# Patient Record
Sex: MALE | Race: BLACK OR AFRICAN AMERICAN | NOT HISPANIC OR LATINO | Employment: PART TIME | URBAN - METROPOLITAN AREA
[De-identification: names, ages, dates, MRNs, and addresses within clinical notes are randomized per-mention and may not be internally consistent; named-entity substitution may affect disease eponyms.]

---
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
## 2019-07-04 ENCOUNTER — APPOINTMENT (EMERGENCY)
Dept: RADIOLOGY | Facility: HOSPITAL | Age: 19
End: 2019-07-04
Payer: COMMERCIAL

## 2019-07-04 ENCOUNTER — HOSPITAL ENCOUNTER (EMERGENCY)
Facility: HOSPITAL | Age: 19
Discharge: HOME | End: 2019-07-04
Attending: EMERGENCY MEDICINE | Admitting: EMERGENCY MEDICINE
Payer: COMMERCIAL

## 2019-07-04 VITALS
SYSTOLIC BLOOD PRESSURE: 109 MMHG | TEMPERATURE: 98.3 F | HEART RATE: 93 BPM | HEIGHT: 73 IN | DIASTOLIC BLOOD PRESSURE: 60 MMHG | RESPIRATION RATE: 14 BRPM | OXYGEN SATURATION: 98 % | WEIGHT: 176.59 LBS | BODY MASS INDEX: 23.4 KG/M2

## 2019-07-04 DIAGNOSIS — S41.119A ARM LACERATION: Primary | ICD-10-CM

## 2019-07-04 PROCEDURE — 90715 TDAP VACCINE 7 YRS/> IM: CPT | Performed by: PHYSICIAN ASSISTANT

## 2019-07-04 PROCEDURE — 99284 EMERGENCY DEPT VISIT MOD MDM: CPT | Performed by: PHYSICIAN ASSISTANT

## 2019-07-04 PROCEDURE — 12001 RPR S/N/AX/GEN/TRNK 2.5CM/<: CPT | Performed by: PHYSICIAN ASSISTANT

## 2019-07-04 PROCEDURE — 73090 X-RAY EXAM OF FOREARM: CPT

## 2019-07-04 PROCEDURE — 90471 IMMUNIZATION ADMIN: CPT

## 2019-07-04 PROCEDURE — 99283 EMERGENCY DEPT VISIT LOW MDM: CPT

## 2019-07-04 RX ORDER — CLINDAMYCIN HYDROCHLORIDE 300 MG/1
300 CAPSULE ORAL EVERY 6 HOURS SCHEDULED
Qty: 40 CAPSULE | Refills: 0 | Status: SHIPPED | OUTPATIENT
Start: 2019-07-04 | End: 2019-07-15

## 2019-07-04 RX ORDER — LIDOCAINE HYDROCHLORIDE 10 MG/ML
5 INJECTION, SOLUTION EPIDURAL; INFILTRATION; INTRACAUDAL; PERINEURAL ONCE
Status: COMPLETED | OUTPATIENT
Start: 2019-07-04 | End: 2019-07-04

## 2019-07-04 RX ORDER — ACETAMINOPHEN 325 MG/1
650 TABLET ORAL ONCE
Status: COMPLETED | OUTPATIENT
Start: 2019-07-04 | End: 2019-07-04

## 2019-07-04 RX ORDER — IBUPROFEN 600 MG/1
600 TABLET ORAL EVERY 6 HOURS PRN
Qty: 30 TABLET | Refills: 0 | Status: SHIPPED | OUTPATIENT
Start: 2019-07-04 | End: 2019-07-15

## 2019-07-04 RX ORDER — CLINDAMYCIN HYDROCHLORIDE 150 MG/1
300 CAPSULE ORAL ONCE
Status: COMPLETED | OUTPATIENT
Start: 2019-07-04 | End: 2019-07-04

## 2019-07-04 RX ADMIN — LIDOCAINE HYDROCHLORIDE 5 ML: 10 INJECTION, SOLUTION EPIDURAL; INFILTRATION; INTRACAUDAL; PERINEURAL at 08:15

## 2019-07-04 RX ADMIN — CLINDAMYCIN HYDROCHLORIDE 300 MG: 150 CAPSULE ORAL at 09:38

## 2019-07-04 RX ADMIN — ACETAMINOPHEN 650 MG: 325 TABLET ORAL at 09:37

## 2019-07-04 RX ADMIN — TETANUS TOXOID, REDUCED DIPHTHERIA TOXOID AND ACELLULAR PERTUSSIS VACCINE, ADSORBED 0.5 ML: 5; 2.5; 8; 8; 2.5 SUSPENSION INTRAMUSCULAR at 09:38

## 2019-07-04 NOTE — ED PROVIDER NOTES
History  Chief Complaint   Patient presents with    Extremity Laceration     arm went through window-arguing with another person and punched window       History provided by:  Patient   used: No    Medical Problem   Location:  Pt with left forearm laceration from broken glass at 1am this morning  Severity:  Mild  Onset quality:  Sudden  Duration:  8 hours  Timing:  Constant  Progression:  Unchanged  Chronicity:  New  Associated symptoms: no abdominal pain, no chest pain, no congestion, no cough, no diarrhea, no ear pain, no fatigue, no fever, no headaches, no loss of consciousness, no myalgias, no nausea, no rash, no rhinorrhea, no shortness of breath, no sore throat, no vomiting and no wheezing        None       No past medical history on file  No past surgical history on file  No family history on file  I have reviewed and agree with the history as documented  Social History     Tobacco Use    Smoking status: Current Some Day Smoker     Types: Cigarettes    Smokeless tobacco: Never Used    Tobacco comment: occasional   Substance Use Topics    Alcohol use: Yes     Comment: socially    Drug use: Not Currently        Review of Systems   Constitutional: Negative  Negative for fatigue and fever  HENT: Negative  Negative for congestion, ear pain, rhinorrhea and sore throat  Eyes: Negative  Respiratory: Negative  Negative for cough, shortness of breath and wheezing  Cardiovascular: Negative  Negative for chest pain  Gastrointestinal: Negative  Negative for abdominal pain, diarrhea, nausea and vomiting  Endocrine: Negative  Genitourinary: Negative  Musculoskeletal: Negative  Negative for myalgias  Skin: Negative  Negative for rash  Allergic/Immunologic: Negative  Neurological: Negative  Negative for loss of consciousness and headaches  Hematological: Negative  Psychiatric/Behavioral: Negative      All other systems reviewed and are negative  Physical Exam  Physical Exam   Constitutional: He is oriented to person, place, and time  He appears well-developed and well-nourished  HENT:   Head: Normocephalic and atraumatic  Right Ear: External ear normal    Left Ear: External ear normal    Nose: Nose normal    Mouth/Throat: Oropharynx is clear and moist    Eyes: Pupils are equal, round, and reactive to light  Conjunctivae and EOM are normal    Neck: Normal range of motion  Neck supple  Cardiovascular: Normal rate, regular rhythm, normal heart sounds and intact distal pulses  Pulmonary/Chest: Effort normal and breath sounds normal    Abdominal: Soft  Bowel sounds are normal    Musculoskeletal: Normal range of motion  Left forearm laceration x 2  Several abrasion  With steristrips  Distal neuro and vascular wnl   Radial pulse strong    Neurological: He is alert and oriented to person, place, and time  Skin: Skin is warm  Nursing note and vitals reviewed  Vital Signs  ED Triage Vitals [07/04/19 0807]   Temperature Pulse Respirations Blood Pressure SpO2   98 3 °F (36 8 °C) 93 14 109/60 98 %      Temp src Heart Rate Source Patient Position - Orthostatic VS BP Location FiO2 (%)   -- -- -- -- --      Pain Score       No Pain           Vitals:    07/04/19 0807   BP: 109/60   Pulse: 93         Visual Acuity      ED Medications  Medications   lidocaine (PF) (XYLOCAINE-MPF) 1 % injection 5 mL (5 mL Infiltration Given 7/4/19 0815)   clindamycin (CLEOCIN) capsule 300 mg (300 mg Oral Given 7/4/19 0938)   acetaminophen (TYLENOL) tablet 650 mg (650 mg Oral Given 7/4/19 0937)   tetanus-diphtheria-acellular pertussis (BOOSTRIX) IM injection 0 5 mL (0 5 mL Intramuscular Given 7/4/19 0624)       Diagnostic Studies  Results Reviewed     None                 XR forearm 2 views LEFT   Final Result by Narcisa Young MD (07/04 1797)      No acute osseous abnormality              Workstation performed: [id number]                    Procedures  Procedures ED Course                               MDM    Disposition  Final diagnoses:   Arm laceration     Time reflects when diagnosis was documented in both MDM as applicable and the Disposition within this note     Time User Action Codes Description Comment    7/4/2019  9:13 AM Antonio Allen  Add [S41 119A] Arm laceration       ED Disposition     ED Disposition Condition Date/Time Comment    Discharge Stable u Jul 4, 2019  9:14 AM Christina Lack discharge to home/self care  Follow-up Information     Follow up With Specialties Details Why Contact Info    Love Bailon MD Family Medicine  sutures out in 10-12 days [address] [place] [address]  [phone]            Discharge Medication List as of 7/4/2019  9:15 AM      START taking these medications    Details   clindamycin (CLEOCIN) 300 MG capsule Take 1 capsule (300 mg total) by mouth every 6 (six) hours for 10 days, Starting u 7/4/2019, Until Sun 7/14/2019, Print      ibuprofen (MOTRIN) 600 mg tablet Take 1 tablet (600 mg total) by mouth every 6 (six) hours as needed for mild pain, Starting u 7/4/2019, Print           No discharge procedures on file      ED Provider  Electronically Signed by           Gamal Golden PA-C  07/04/19 [address] Vadim Carballo PA-C  07/04/19 8369

## 2019-07-04 NOTE — ED PROCEDURE NOTE
Procedure  Laceration repair  Date/Time: 7/4/2019 8:46 AM  Performed by: Doris Frazier PA-C  Authorized by: Doris Frazier PA-C   Consent: Verbal consent obtained  Written consent not obtained  Risks and benefits: risks, benefits and alternatives were discussed  Consent given by: patient  Patient understanding: patient states understanding of the procedure being performed  Patient consent: the patient's understanding of the procedure matches consent given  Procedure consent: procedure consent matches procedure scheduled  Relevant documents: relevant documents present and verified  Test results: test results available and properly labeled  Site marked: the operative site was marked  Radiology Images displayed and confirmed  If images not available, report reviewed: imaging studies available  Required items: required blood products, implants, devices, and special equipment available  Patient identity confirmed: verbally with patient  Time out: Immediately prior to procedure a "time out" was called to verify the correct patient, procedure, equipment, support staff and site/side marked as required  Body area: upper extremity  Location details: left lower arm  Laceration length: 0 5 cm  Foreign bodies: no foreign bodies  Tendon involvement: none  Nerve involvement: none  Vascular damage: no  Anesthesia: local infiltration    Anesthesia:  Local Anesthetic: lidocaine 1% without epinephrine  Anesthetic total: 4 mL    Sedation:  Patient sedated: no      Wound Dehiscence:  Superficial Wound Dehiscence: simple closure      Procedure Details:  Preparation: Patient was prepped and draped in the usual sterile fashion    Irrigation solution: tap water  Irrigation method: syringe  Amount of cleaning: extensive  Debridement: none  Degree of undermining: none  Skin closure: 4-0 nylon  Number of sutures: 6  Technique: simple  Approximation: loose  Approximation difficulty: simple  Dressing: 4x4 sterile gauze  Patient tolerance: Patient tolerated the procedure well with no immediate complications                       Reginald Bueno PA-C  07/04/19 3476

## 2020-07-10 ENCOUNTER — HOSPITAL ENCOUNTER (EMERGENCY)
Facility: HOSPITAL | Age: 20
Discharge: HOME | End: 2020-07-10
Attending: EMERGENCY MEDICINE | Admitting: EMERGENCY MEDICINE
Payer: COMMERCIAL

## 2020-07-10 VITALS
OXYGEN SATURATION: 98 % | RESPIRATION RATE: 14 BRPM | WEIGHT: 195.33 LBS | DIASTOLIC BLOOD PRESSURE: 79 MMHG | TEMPERATURE: 98.1 F | HEART RATE: 64 BPM | BODY MASS INDEX: 25.08 KG/M2 | SYSTOLIC BLOOD PRESSURE: 119 MMHG

## 2020-07-10 DIAGNOSIS — Z02.89 ENCOUNTER TO OBTAIN EXCUSE FROM WORK: Primary | ICD-10-CM

## 2020-07-10 PROCEDURE — 99282 EMERGENCY DEPT VISIT SF MDM: CPT

## 2020-07-10 PROCEDURE — 99281 EMR DPT VST MAYX REQ PHY/QHP: CPT | Performed by: PHYSICIAN ASSISTANT

## 2020-07-10 NOTE — ED PROVIDER NOTES
History  Chief Complaint   Patient presents with    Letter for School/Work     pt needs note to return to work, pt states he hurt right shoulder last week employer asking note to return to work no c/o at this time      59-year-old male with no past medical history presents emergency department for evaluation for work note  Patient reports he hurt his right shoulder while moving some heavy boxes 1 week ago  Patient was seen by his PCP, and his job is requiring a return to work note  Patient reports that the right shoulder pain has completely resolved, denies any numbness, tingling in his right hand  Patient reports he is right-hand dominant  Patient offers no other complaints at this time  History provided by:  Patient and medical records   used: No        None       History reviewed  No pertinent past medical history  History reviewed  No pertinent surgical history  History reviewed  No pertinent family history  I have reviewed and agree with the history as documented  E-Cigarette/Vaping    E-Cigarette Use Never User      E-Cigarette/Vaping Substances     Social History     Tobacco Use    Smoking status: Current Some Day Smoker     Types: Cigarettes    Smokeless tobacco: Never Used    Tobacco comment: occasional- black&milds   Substance Use Topics    Alcohol use: Yes     Comment: socially    Drug use: Not Currently       Review of Systems   Constitutional: Negative for chills and fever  HENT: Negative for congestion and sore throat  Respiratory: Negative for cough and shortness of breath  Cardiovascular: Negative for chest pain  Gastrointestinal: Negative for abdominal pain, diarrhea, nausea and vomiting  Skin: Negative for rash  Neurological: Negative for headaches  All other systems reviewed and are negative  Physical Exam  Physical Exam   Constitutional: He is oriented to person, place, and time  He appears well-developed and well-nourished  Non-toxic appearance  He does not appear ill  No distress  HENT:   Head: Normocephalic and atraumatic  Right Ear: Hearing and external ear normal    Left Ear: Hearing and external ear normal    Nose: Nose normal    Mouth/Throat: Mucous membranes are normal    Eyes: Conjunctivae and EOM are normal    Neck: No JVD present  No tracheal deviation present  Cardiovascular: Normal rate, regular rhythm, S1 normal, S2 normal and normal heart sounds  Pulmonary/Chest: Effort normal and breath sounds normal  No accessory muscle usage or stridor  No respiratory distress  He has no decreased breath sounds  He has no wheezes  He has no rales  Musculoskeletal:   Moves all four limbs without difficulty, crepitus, swelling, or deformity  Specifically, FROM to the right shoulder without pain  Neurological: He is alert and oriented to person, place, and time  GCS eye subscore is 4  GCS verbal subscore is 5  GCS motor subscore is 6  Skin: Skin is warm and dry  Capillary refill takes less than 2 seconds  No rash noted  Psychiatric: He has a normal mood and affect  His speech is normal    Nursing note and vitals reviewed  Vital Signs  ED Triage Vitals [07/10/20 1429]   Temperature Pulse Respirations Blood Pressure SpO2   98 1 °F (36 7 °C) 64 14 119/79 98 %      Temp Source Heart Rate Source Patient Position - Orthostatic VS BP Location FiO2 (%)   Temporal Monitor Sitting Right arm --      Pain Score       No Pain           Vitals:    07/10/20 1429   BP: 119/79   Pulse: 64   Patient Position - Orthostatic VS: Sitting         Visual Acuity      ED Medications  Medications - No data to display    Diagnostic Studies  Results Reviewed     None                 No orders to display              Procedures  Procedures         ED Course       US AUDIT      Most Recent Value   Initial Alcohol Screen: US AUDIT-C    1  How often do you have a drink containing alcohol?  0 Filed at: 07/10/2020 1430   2   How many drinks containing alcohol do you have on a typical day you are drinking? 0 Filed at: 07/10/2020 1430   3a  Male UNDER 65: How often do you have five or more drinks on one occasion? 0 Filed at: 07/10/2020 1430   3b  FEMALE Any Age, or MALE 65+: How often do you have 4 or more drinks on one occassion? 0 Filed at: 07/10/2020 1430   Audit-C Score  0 Filed at: 07/10/2020 1430                  CECILIA/DAST-10      Most Recent Value   How many times in the past year have you    Used an illegal drug or used a prescription medication for non-medical reasons? Never Filed at: 07/10/2020 1430                                MDM  Number of Diagnoses or Management Options  Encounter to obtain excuse from work:         Disposition  Final diagnoses:   Encounter to obtain excuse from work     Time reflects when diagnosis was documented in both MDM as applicable and the Disposition within this note     Time User Action Codes Description Comment    7/10/2020  2:56 PM Dennise Gotti [Z02 [id number]] Encounter to obtain excuse from work       ED Disposition     ED Disposition Condition Date/Time Comment    Discharge Stable Fri Jul 10, 2020  2:56 PM Bakari Burton discharge to home/self care  Follow-up Information     Follow up With Specialties Details Why Contact Info    Cassi Parker MD Family Medicine Schedule an appointment as soon as possible for a visit  If symptoms worsen, As needed [address] [place] [address]  [phone]            There are no discharge medications for this patient  No discharge procedures on file      PDMP Review     None          ED Provider  Electronically Signed by           Ruthy Spangler PA-C  07/10/20 4047

## 2020-09-28 ENCOUNTER — HOSPITAL ENCOUNTER (EMERGENCY)
Facility: HOSPITAL | Age: 20
Discharge: HOME | End: 2020-09-28
Attending: EMERGENCY MEDICINE | Admitting: EMERGENCY MEDICINE
Payer: COMMERCIAL

## 2020-09-28 VITALS
HEART RATE: 67 BPM | TEMPERATURE: 98.4 F | RESPIRATION RATE: 18 BRPM | OXYGEN SATURATION: 97 % | DIASTOLIC BLOOD PRESSURE: 64 MMHG | SYSTOLIC BLOOD PRESSURE: 119 MMHG

## 2020-09-28 DIAGNOSIS — M54.50 ACUTE BILATERAL LOW BACK PAIN WITHOUT SCIATICA: Primary | ICD-10-CM

## 2020-09-28 PROCEDURE — 99284 EMERGENCY DEPT VISIT MOD MDM: CPT | Performed by: PHYSICIAN ASSISTANT

## 2020-09-28 PROCEDURE — 99283 EMERGENCY DEPT VISIT LOW MDM: CPT

## 2020-09-28 RX ORDER — METHOCARBAMOL 500 MG/1
500 TABLET, FILM COATED ORAL ONCE
Status: COMPLETED | OUTPATIENT
Start: 2020-09-28 | End: 2020-09-28

## 2020-09-28 RX ORDER — LIDOCAINE 50 MG/G
1 PATCH TOPICAL ONCE
Status: DISCONTINUED | OUTPATIENT
Start: 2020-09-28 | End: 2020-09-28 | Stop reason: HOSPADM

## 2020-09-28 RX ORDER — IBUPROFEN 600 MG/1
600 TABLET ORAL ONCE
Status: COMPLETED | OUTPATIENT
Start: 2020-09-28 | End: 2020-09-28

## 2020-09-28 RX ORDER — METHOCARBAMOL 500 MG/1
500 TABLET, FILM COATED ORAL EVERY 12 HOURS PRN
Qty: 14 TABLET | Refills: 0 | Status: SHIPPED | OUTPATIENT
Start: 2020-09-28 | End: 2020-10-24

## 2020-09-28 RX ORDER — IBUPROFEN 600 MG/1
600 TABLET ORAL EVERY 6 HOURS PRN
Qty: 20 TABLET | Refills: 0 | Status: SHIPPED | OUTPATIENT
Start: 2020-09-28 | End: 2020-10-24

## 2020-09-28 RX ADMIN — LIDOCAINE 1 PATCH: 50 PATCH CUTANEOUS at 13:56

## 2020-09-28 RX ADMIN — METHOCARBAMOL 500 MG: 500 TABLET ORAL at 13:56

## 2020-09-28 RX ADMIN — IBUPROFEN 600 MG: 600 TABLET ORAL at 13:56

## 2020-10-24 ENCOUNTER — HOSPITAL ENCOUNTER (EMERGENCY)
Facility: HOSPITAL | Age: 20
Discharge: HOME | End: 2020-10-24
Attending: EMERGENCY MEDICINE | Admitting: EMERGENCY MEDICINE
Payer: COMMERCIAL

## 2020-10-24 VITALS
TEMPERATURE: 98.2 F | DIASTOLIC BLOOD PRESSURE: 92 MMHG | SYSTOLIC BLOOD PRESSURE: 144 MMHG | HEART RATE: 63 BPM | OXYGEN SATURATION: 98 % | RESPIRATION RATE: 18 BRPM

## 2020-10-24 DIAGNOSIS — R30.0 DYSURIA: Primary | ICD-10-CM

## 2020-10-24 LAB
BACTERIA UR QL AUTO: ABNORMAL /HPF
BILIRUB UR QL STRIP: NEGATIVE
CLARITY UR: CLEAR
CLARITY, POC: CLEAR
COLOR UR: YELLOW
COLOR, POC: YELLOW
GLUCOSE UR STRIP-MCNC: NEGATIVE MG/DL
HGB UR QL STRIP.AUTO: ABNORMAL
KETONES UR STRIP-MCNC: NEGATIVE MG/DL
LEUKOCYTE ESTERASE UR QL STRIP: ABNORMAL
NITRITE UR QL STRIP: NEGATIVE
NON-SQ EPI CELLS URNS QL MICRO: ABNORMAL /HPF
PH UR STRIP.AUTO: 6 [PH] (ref 4.5–8)
PROT UR STRIP-MCNC: NEGATIVE MG/DL
RBC #/AREA URNS AUTO: ABNORMAL /HPF
SP GR UR STRIP.AUTO: 1.02 (ref 1–1.03)
UROBILINOGEN UR QL STRIP.AUTO: 0.2 E.U./DL
WBC #/AREA URNS AUTO: ABNORMAL /HPF

## 2020-10-24 PROCEDURE — 87086 URINE CULTURE/COLONY COUNT: CPT

## 2020-10-24 PROCEDURE — 87591 N.GONORRHOEAE DNA AMP PROB: CPT | Performed by: EMERGENCY MEDICINE

## 2020-10-24 PROCEDURE — 99285 EMERGENCY DEPT VISIT HI MDM: CPT | Performed by: EMERGENCY MEDICINE

## 2020-10-24 PROCEDURE — 81001 URINALYSIS AUTO W/SCOPE: CPT

## 2020-10-24 PROCEDURE — 96372 THER/PROPH/DIAG INJ SC/IM: CPT

## 2020-10-24 PROCEDURE — 99283 EMERGENCY DEPT VISIT LOW MDM: CPT

## 2020-10-24 PROCEDURE — 87491 CHLMYD TRACH DNA AMP PROBE: CPT | Performed by: EMERGENCY MEDICINE

## 2020-10-24 RX ORDER — AZITHROMYCIN 250 MG/1
500 TABLET, FILM COATED ORAL ONCE
Status: COMPLETED | OUTPATIENT
Start: 2020-10-24 | End: 2020-10-24

## 2020-10-24 RX ADMIN — LIDOCAINE HYDROCHLORIDE 250 MG: 10 INJECTION, SOLUTION EPIDURAL; INFILTRATION; INTRACAUDAL; PERINEURAL at 13:21

## 2020-10-24 RX ADMIN — AZITHROMYCIN MONOHYDRATE 500 MG: 250 TABLET ORAL at 13:21

## 2020-10-25 LAB
BACTERIA UR CULT: NORMAL
C TRACH DNA SPEC QL NAA+PROBE: POSITIVE
N GONORRHOEA DNA SPEC QL NAA+PROBE: NEGATIVE

## 2021-03-31 ENCOUNTER — APPOINTMENT (EMERGENCY)
Dept: RADIOLOGY | Facility: HOSPITAL | Age: 21
End: 2021-03-31
Payer: COMMERCIAL

## 2021-03-31 ENCOUNTER — HOSPITAL ENCOUNTER (EMERGENCY)
Facility: HOSPITAL | Age: 21
Discharge: HOME | End: 2021-03-31
Attending: EMERGENCY MEDICINE | Admitting: EMERGENCY MEDICINE
Payer: COMMERCIAL

## 2021-03-31 VITALS
RESPIRATION RATE: 16 BRPM | HEART RATE: 69 BPM | SYSTOLIC BLOOD PRESSURE: 137 MMHG | BODY MASS INDEX: 28.25 KG/M2 | OXYGEN SATURATION: 100 % | WEIGHT: 220 LBS | DIASTOLIC BLOOD PRESSURE: 83 MMHG | TEMPERATURE: 97.9 F

## 2021-03-31 DIAGNOSIS — S82.899A ANKLE FRACTURE: Primary | ICD-10-CM

## 2021-03-31 PROCEDURE — 73610 X-RAY EXAM OF ANKLE: CPT

## 2021-03-31 PROCEDURE — 29515 APPLICATION SHORT LEG SPLINT: CPT | Performed by: PHYSICIAN ASSISTANT

## 2021-03-31 PROCEDURE — 99283 EMERGENCY DEPT VISIT LOW MDM: CPT

## 2021-03-31 PROCEDURE — 99284 EMERGENCY DEPT VISIT MOD MDM: CPT | Performed by: PHYSICIAN ASSISTANT

## 2021-03-31 RX ORDER — IBUPROFEN 400 MG/1
400 TABLET ORAL ONCE
Status: COMPLETED | OUTPATIENT
Start: 2021-03-31 | End: 2021-03-31

## 2021-03-31 RX ADMIN — IBUPROFEN 400 MG: 400 TABLET ORAL at 18:26

## 2021-03-31 NOTE — ED PROVIDER NOTES
History  Chief Complaint   Patient presents with    Ankle Pain     Pt reports rolling R ankle yesterday while playing basketball, reports swelling and pain  Patient is a 22 y/o male with no significant PMH who presents for evaluation of right ankle pain  He states he was playing basketball yesterday when he came down on it wrong and rolled it  He was able to stand and weight bear after the fall/injury however pain started later in the evening and has been constant since  He complains of pain, swelling, bruising to the medial and lateral aspects of the ankle  He states the pain does radiate into the proximal foot  He denies other injury during the fall  He states he has twisted/sprained that ankle in the past but denies fracture or surgery  He states yesterday he took ibuprofen which provided some relief however he did not take any medications today  He denies fever, chills, nausea, vomiting, diarrhea, chest pain, shrotness of breath, abdominal, numbness or weakness  None       Past Medical History:   Diagnosis Date    No known problems        History reviewed  No pertinent surgical history  History reviewed  No pertinent family history  I have reviewed and agree with the history as documented  E-Cigarette/Vaping    E-Cigarette Use Never User      E-Cigarette/Vaping Substances    Nicotine No     THC No     CBD No     Flavoring No     Other No     Unknown No      Social History     Tobacco Use    Smoking status: Never Smoker    Smokeless tobacco: Never Used    Tobacco comment: occasional- black&milds   Substance Use Topics    Alcohol use: Not Currently     Comment: socially    Drug use: Not Currently       Review of Systems   Constitutional: Negative for chills and fever  Respiratory: Negative for shortness of breath  Cardiovascular: Negative for chest pain  Gastrointestinal: Negative for abdominal pain, diarrhea, nausea and vomiting     Musculoskeletal: Positive for arthralgias, gait problem and joint swelling  Skin: Negative for rash  Neurological: Negative for weakness and numbness  All other systems reviewed and are negative  Physical Exam  Physical Exam  Vitals signs and nursing note reviewed  Constitutional:       General: He is not in acute distress  Appearance: Normal appearance  He is normal weight  He is not ill-appearing, toxic-appearing or diaphoretic  HENT:      Head: Normocephalic and atraumatic  Right Ear: External ear normal       Left Ear: External ear normal    Eyes:      Conjunctiva/sclera: Conjunctivae normal    Neck:      Musculoskeletal: Normal range of motion  Cardiovascular:      Rate and Rhythm: Normal rate and regular rhythm  Heart sounds: Normal heart sounds  No murmur  Pulmonary:      Effort: Pulmonary effort is normal  No respiratory distress  Breath sounds: Normal breath sounds  No stridor  No wheezing or rhonchi  Abdominal:      General: Abdomen is flat  There is no distension  Musculoskeletal:      Right ankle: He exhibits decreased range of motion, swelling and ecchymosis  He exhibits no deformity, no laceration and normal pulse  Tenderness  Lateral malleolus and medial malleolus tenderness found  No head of 5th metatarsal and no proximal fibula tenderness found  Achilles tendon normal    Skin:     General: Skin is warm and dry  Neurological:      General: No focal deficit present  Mental Status: He is alert     Psychiatric:         Mood and Affect: Mood normal          Vital Signs  ED Triage Vitals   Temperature Pulse Respirations Blood Pressure SpO2   03/31/21 1648 03/31/21 1646 03/31/21 1646 03/31/21 1646 03/31/21 1646   97 9 °F (36 6 °C) 69 16 137/83 100 %      Temp Source Heart Rate Source Patient Position - Orthostatic VS BP Location FiO2 (%)   03/31/21 1648 03/31/21 1646 03/31/21 1646 03/31/21 1646 --   Oral Monitor Sitting Left arm       Pain Score       03/31/21 1646       6 Vitals:    03/31/21 1646   BP: 137/83   Pulse: 69   Patient Position - Orthostatic VS: Sitting         Visual Acuity      ED Medications  Medications   ibuprofen (MOTRIN) tablet 400 mg (400 mg Oral Given 3/31/21 1826)       Diagnostic Studies  Results Reviewed     None                 XR ankle 3+ views RIGHT    (Results Pending)              Procedures  Splint application    Date/Time: 3/31/2021 7:18 PM  Performed by: Eladia Marinelli PA-C  Authorized by: Eladia Marinelli PA-C   Universal Protocol:  Procedure performed by: (ED tech and checked by me )  Consent: Verbal consent obtained  Risks and benefits: risks, benefits and alternatives were discussed  Patient understanding: patient states understanding of the procedure being performed  Radiology Images displayed and confirmed  If images not available, report reviewed: imaging studies available  Required items: required blood products, implants, devices, and special equipment available      Pre-procedure details:     Sensation:  Normal  Procedure details:     Laterality:  Right    Location:  Ankle    Splint type:  Short leg    Supplies:  Cotton padding, Ortho-Glass and elastic bandage             ED Course                                           MDM  Number of Diagnoses or Management Options  Ankle fracture:   Diagnosis management comments: X-ray reviewed by me and interpreted as avulsion fracture noted to the medial malleolus  Discussed results with patient  Explained the x-ray will be further read by radiologist and if any discrepancies arise will be contacted  A posterior short-leg splint was applied and patient was given crutches and instructed on nonweightbearing status  Discussed other supportive care including rest, elevation, Tylenol Motrin alternating for pain  Instructed follow up family doctor as well as Orthopedics  Given contact information to call to schedule an orthopedic appointment tomorrow    Discussed strict return precautions if symptoms worsen or new symptoms arise  Patient states understanding agrees with plan  Amount and/or Complexity of Data Reviewed  Tests in the radiology section of CPT®: ordered and reviewed  Independent visualization of images, tracings, or specimens: yes    Patient Progress  Patient progress: stable      Disposition  Final diagnoses: Ankle fracture     Time reflects when diagnosis was documented in both MDM as applicable and the Disposition within this note     Time User Action Codes Description Comment    3/31/2021  6:52 PM Jovana Padilla Ana María [[id number]] Ankle fracture       ED Disposition     ED Disposition Condition Date/Time Comment    Discharge Stable Wed Mar 31, 2021  6:52 PM Rachell Bay discharge to home/self care  Follow-up Information     Follow up With Specialties Details Why Contact Info Additional 0362 Ahsan Vickers MD Family Medicine Schedule an appointment as soon as possible for a visit in 1 day  [address] [place] [address] [id number]       [address] Orthopedic Surgery Schedule an appointment as soon as possible for a visit in 1 day For your ankle fracture [address] [facility]  [place] [address] [facility] [address]  [address] [facility], [address] [facility], [address] [place], [place], [address]   [address] [facility] Emergency Department Emergency Medicine  If symptoms worsen [facility] [id number]  [address] [place] [address] Emergency Department, [address] [place] [address], [phone]          There are no discharge medications for this patient  No discharge procedures on file      PDMP Review     None          ED Provider  Electronically Signed by           Reji Golden PA-C  03/31/21 1943

## 2021-03-31 NOTE — Clinical Note
Carroll Gonsales was seen and treated in our emergency department on 3/31/2021  No work until cleared by Family Doctor/Orthopedics        Diagnosis:     Esmer Crowe    He may return on this date: If you have any questions or concerns, please don't hesitate to call        Paulo Mcleod PA-C    ______________________________           _______________          _______________  Hospital Representative                              Date                                Time

## 2021-03-31 NOTE — ED NOTES
Wrapped and iced ankle and took ibuprofen  Did not help with pain       Alen Shock, RN  03/31/21 3537

## 2021-04-02 ENCOUNTER — OFFICE VISIT (OUTPATIENT)
Dept: OBGYN CLINIC | Facility: CLINIC | Age: 21
End: 2021-04-02
Payer: COMMERCIAL

## 2021-04-02 VITALS — BODY MASS INDEX: 28.23 KG/M2 | WEIGHT: 220 LBS | HEIGHT: 74 IN

## 2021-04-02 DIAGNOSIS — S93.491A SPRAIN OF ANTERIOR TALOFIBULAR LIGAMENT OF RIGHT ANKLE, INITIAL ENCOUNTER: Primary | ICD-10-CM

## 2021-04-02 DIAGNOSIS — S93.421A SPRAIN OF DELTOID LIGAMENT OF RIGHT ANKLE, INITIAL ENCOUNTER: ICD-10-CM

## 2021-04-02 PROCEDURE — 99243 OFF/OP CNSLTJ NEW/EST LOW 30: CPT | Performed by: ORTHOPAEDIC SURGERY

## 2021-04-02 RX ORDER — IBUPROFEN 600 MG/1
1 TABLET ORAL EVERY 8 HOURS
COMMUNITY
Start: 2013-02-19

## 2021-04-02 NOTE — PROGRESS NOTES
NATHAN Robison  Attending, Orthopaedic Surgery  [facility]      Assessment:     Encounter Diagnoses   Name Primary?  Sprain of anterior talofibular ligament of right ankle, initial encounter Yes    Sprain of deltoid ligament of right ankle, initial encounter               Plan:     The patient has sustained a sprain of the right DELTOID LIGAMENT with associated avulsion fracture off of his medial malleolus  This takes much longer to heal than the typical ankle sprain  We discussed  We will begin physical therapy as soon as the patient tolerates- Order placed  Instructions for Home stretching program provided in AVSS  Instructions given for rest, ice 20mins/hr, elevation, and Ace wrap given for compression  The patient was prescribed a CAM boot to be worn at all times while not in PT  Wean this boot within two weeks  Work/School restrictions given      Follow up will be in 7 weeks  David Villanueva MD          Subjective:    Chief Complaint:    Chief Complaint   Patient presents with    Right Ankle - Pain        Abhishek Powell is a 23 y o  male referred by PCP for evaluation and treatment of an injury to the right ankle  This is evaluated as a personal injury  The injury occurred 2 days ago, and occurred while playing basketball  The patient states the ankle rolled outward at the time of injury  He did hear or sense a pop or snap at the time of the injury  The patient notes pain and mild swelling of the ankle since the injury  He has treated the ankle with posterior splint  Pain is localized to the anteromedial and anterolateral  malleolar area   He has sprained this ankle in the past     Pain/symptom location: the right lower leg  Pain/symptom quality: sharp  Pain/symptom severity: constant  Pain/symptom timing:  Worse during the day when active  Pain/symptom conext:  Worse with activites and work  Pain/symptom modifying factors:  Rest makes better, activities make worse  Pain/symptom associated signs/symptoms: none    Outside reports reviewed: ER records  Foot and Ankle Surgical History:   See Below    Past Medical, Surgical and Social History:  Past Medical History:  has a past medical history of No known problems  Problem List: does not have a problem list on file  Past Surgical History:  has no past surgical history on file  Family History: family history is not on file  Social History:  reports that he has never smoked  He has never used smokeless tobacco  He reports previous alcohol use  He reports previous drug use  Current Medications: has a current medication list which includes the following prescription(s): ibuprofen  Allergies: is allergic to amoxicillin  Review of Systems:  General- denies fever/chills  HEENT- denies hearing loss or sore throat  Eyes- denies eye pain or visual disturbances, denies red eyes  Respiratory- denies cough or SOB  Cardio- denies chest pain or palpitations  GI- denies abdominal pain  Endocrine- denies urinary frequency  Urinary- denies pain with urination  Musculoskeletal- Negative except noted above  Skin- denies rashes or wounds  Neurological- denies dizziness or headache  Psychiatric- denies anxiety or difficulty concentrating    Objective:        Ht 6' 2" (1 88 m)   Wt 99 8 kg (220 lb)   BMI 28 25 kg/m²   General/Constitutional: No apparent distress: well-nourished and well developed  Eyes: normal ocular motion  Lymphatic: No appreciable lymphadenopathy  Respiratory: Non-labored breathing  Vascular: No edema, swelling or tenderness, except as noted in detailed exam   Integumentary: No impressive skin lesions present, except as noted in detailed exam   Neuro: No ataxia or abnormal movements  Psych: Normal mood and affect, oriented to person, place and time  MSK: normal other than stated in HPI and exam      Gait:  Normal  The patient cannot bear weight on the injured extremity     Right Ankle  Proximal Fibula:   no tenderness noted   Edema: Moderate swelling circumferentially in the ankle   Ecchymosis:   Moderate in lateral ankle   Crepitus  None   Active ROM:  50% of normal    Passive ROM:   75% of normal    Palpation:  Significant tenderness of the anterolateral ligaments   Stability :   No joint laxity  Drawer sign equal to unaffected ankle  Syndosmosis:   syndesmotic ligament IS NOT tender   Sensation:     Intact in all distributions   Pulses:  normal DP and PT pulses       Imaging  X-ray of the ankle/foot: 3 views of the ankle reveal a stable mortise joint, moderate soft tissue swelling, and small avulsion evidence of fracture  Reviewed by me personally  I personally performed this service    Scribe Attestation    I,:  Familia Hidalgo MA am acting as a scribe while in the presence of the attending physician :       I,:  Reji Brewer MD personally performed the services described in this documentation    as scribed in my presence :

## 2021-04-02 NOTE — LETTER
April 2, 2021     Patient: Elizabeth Mandy   YOB: 2000   Date of Visit: 4/2/2021       To Whom it May Concern:    Sindhu Mojica is under my professional care  He was seen in my office on 4/2/2021  He is not cleared to return to work at this time  We will update his work status in 7 weeks at his next evaluation  If you have any questions or concerns, please don't hesitate to call           Sincerely,          Santy De La Vega MD        CC: Elizabeth Galvan

## 2021-04-09 ENCOUNTER — APPOINTMENT (OUTPATIENT)
Dept: PHYSICAL THERAPY | Facility: CLINIC | Age: 21
End: 2021-04-09
Payer: COMMERCIAL

## 2021-04-14 ENCOUNTER — EVALUATION (OUTPATIENT)
Dept: PHYSICAL THERAPY | Facility: CLINIC | Age: 21
End: 2021-04-14
Payer: COMMERCIAL

## 2021-04-14 DIAGNOSIS — S93.421A SPRAIN OF DELTOID LIGAMENT OF RIGHT ANKLE, INITIAL ENCOUNTER: Primary | ICD-10-CM

## 2021-04-14 DIAGNOSIS — S93.491A SPRAIN OF ANTERIOR TALOFIBULAR LIGAMENT OF RIGHT ANKLE, INITIAL ENCOUNTER: ICD-10-CM

## 2021-04-14 PROCEDURE — 97110 THERAPEUTIC EXERCISES: CPT

## 2021-04-14 PROCEDURE — 97161 PT EVAL LOW COMPLEX 20 MIN: CPT

## 2021-04-14 NOTE — PROGRESS NOTES
PT Evaluation     Today's date: 2021  Patient name: Dieudonne Valverde  : 2000  MRN: [id number]  Referring provider: Trung Guzman MD  Dx:   Encounter Diagnosis     ICD-10-CM    1  Sprain of deltoid ligament of right ankle, initial encounter  K72 172B Ambulatory referral to Physical Therapy   2  Sprain of anterior talofibular ligament of right ankle, initial encounter  S93 491A Ambulatory referral to Physical Therapy                  Assessment  Assessment details: Dieudonne Valverde is a 23 y o  male who presents today to outpatient therapy for evaluation of sprain of deltoid ligament of right ankle, initial encounter and sprain of anterior talofibular ligament of right ankle, initial encounter  Upon assessment today, pt exhibits gait deviations; decreased/painful AROM thru the (R) ankle; decreased strength thru the (R) ankle; decreased lower quarter stability; and TTP thru the (R) ankle  These impairments are contributing to functional limitations with walking; standing; running; working; negotiating stairs; and playing basketball  Pt would therefore benefit from PT intervention in order to address the aforementioned deficits so that he can return to his PLOF and function comfortably/safely in his home and surrounding environment  Thank you for the referral! - Rosalia Males, PT, DPT  Impairments: abnormal gait, abnormal or restricted ROM, abnormal movement, impaired balance, impaired physical strength, pain with function and poor body mechanics  Understanding of Dx/Px/POC: good   Prognosis: good    Goals  STG 1: Pt will demonstrate compliance with HEP to supplement therapy in 1-2 weeks  STG 2: Pt will demonstrate increased (R) ankle DF AROM by 5-10 deg in 2-4 weeks  STG 3: Pt will be able to ambulate without AD and with min to no gait deviations in 2-4 weeks  LTG 1: Pt will be able to ambulate 1 mile with min increased symptoms to assist pt w/ completing work duties in 6-8 weeks    LTG 2: Pt will be able to stand comfortably for 1-2 hours with min increased symptoms in 6-8 weeks  LTG 3: Pt will be able to negotiate 1 flight of stairs reciprocally with minimal difficulty in 6-8 weeks  LTG 4: Pt will be able to lift/carry 25# with proper body mechanics in 6-8 weeks to assist pt w/ returning to work duties  LTG 5: Pt will be able to run 1 mile with min difficulty related to the (R) ankle in 8-10 weeks  Plan  Plan details: Educated pt today about prognosis; ROM goals; diagnosis; swelling; weaning out of boot and gradually to sneaker; and managing swelling at home (with ice, movement, and elevation)  Also provided pt w/ HEP  Patient would benefit from: skilled physical therapy  Planned modality interventions: cryotherapy, TENS and unattended electrical stimulation  Planned therapy interventions: abdominal trunk stabilization, postural training, patient education, neuromuscular re-education, joint mobilization, manual therapy, massage, activity modification, balance, body mechanics training, Lieberman taping, strengthening, stretching, therapeutic activities, coordination, flexibility, home exercise program, therapeutic exercise, functional ROM exercises, gait training, balance/weight bearing training and self care  Frequency: 2x week  Duration in weeks: 4  Treatment plan discussed with: patient and family        Subjective Evaluation    History of Present Illness  Mechanism of injury: Pt states that about 2 weeks ago, he was playing basketball at \[employer] [facility]"; he tried to perform a lay up but landed wrong on his ankle  He waited a night before then going to the hospital because the pain did not subside  At the hospital they wrapped the ankle with a half cast and performed x-rays which revealed a slight avulsion fracture and a sprained ankle (however this did not warrant surgery)   He recently f/u with Dr Breanne Amos who instructed pt to wear a CAM boot for the next 2 weeks and to then transition to sneaker (which is this Friday)  Currently, pt reports difficulty walking; standing; running; working; negotiating stairs; and playing basketball d/t inc (R) ankle pain/dysfunction  Pt reports an MD f/u on   Pain  Current pain rating: 3  At best pain rating: 3  At worst pain ratin  Location: Pt reports pain thru the (R) medial ankle  Patient Goals  Patient goals for therapy: return to work and return to sport/leisure activities (Return to basketball )          Objective     Observations     Additional Observation Details  Unable to visually inspect (R) foot d/t presence of ace wrap  Palpation     Additional Palpation Details  Mod TTP thru (R) deltoid ligament, post tib, and Achilles  LTG: Min TTP  Active Range of Motion   Left Ankle/Foot   Dorsiflexion (ke): 4 degrees   Inversion: 40 degrees   Eversion: 5 degrees   Great toe extension: 70 degrees     Right Ankle/Foot   Dorsiflexion (ke): -20 degrees with pain  Inversion: 18 degrees with pain  Eversion: 0 degrees with pain  Great toe extension: 50 degrees     Strength/Myotome Testing     Left Ankle/Foot   Dorsiflexion: 5  Left ankle plantar flexion strength: 25 SL HR  Inversion: 5  Eversion: 5    Right Ankle/Foot   Dorsiflexion: 3+  Right ankle plantar flexion strength: SL NT d/t precautions  Inversion: 3+  Eversion: 3+ (painful)    General Comments: Ankle/Foot Comments   Gait: Pt ambulates thru clinic with CAM boot donned on (R) LE; inc (R) lateral trunk lean/lurching; increasing (R) hip abd; decreased (R) knee flex; mod antalgia  Rhomber secs, steady, unremarkable    (R) Tandem stance: 7 secs, mod sway, unsteady  (L) tandem stance: 20 secs, min sway, steady             Precautions: (R) ankle sprain 3/31/2021; Start transitioning from CAM boot to sneaker 2021 per last MD appt on 21    Date             FOTO IE             Re-eval IE                Manuals             (R) ankle PROM nv            (R) ankle TC joint mobs nv Neuro Re-Ed 4/14            Biodex weight shifts  nv            Towel scrunches nv            Intrinsic doming nv            BAPs (R) nv            SLS on (R)*             Tandem stance nv                                      Ther Ex 4/14            Runner's gastroc (S)             Daniel wijessica nv            Seated HR/TR nv            Ankle pumps, elevated nv            Toe flex/ext, elevated nv            Ankle alphabets, elevated nv            Iso ankle 4 way  nv            Supine gastroc (S) with strap (R) 4x15" HEP            Supine soleus (S) with strap (R) 4x15" HEP            Supine GT (S) with strap, (R) 5x10" HEP            Clams             4 way ankle Tband*             4 way SLR nv            Pt Edu AL HEP                         Ther Activity 4/14            Recumbent bike nv            Squats             Sidesteps                          Gait Training 4/14                                      Modalities 4/14            Ice, prn

## 2021-04-20 ENCOUNTER — OFFICE VISIT (OUTPATIENT)
Dept: PHYSICAL THERAPY | Facility: CLINIC | Age: 21
End: 2021-04-20
Payer: COMMERCIAL

## 2021-04-20 DIAGNOSIS — S93.491A SPRAIN OF ANTERIOR TALOFIBULAR LIGAMENT OF RIGHT ANKLE, INITIAL ENCOUNTER: Primary | ICD-10-CM

## 2021-04-20 DIAGNOSIS — S93.421A SPRAIN OF DELTOID LIGAMENT OF RIGHT ANKLE, INITIAL ENCOUNTER: ICD-10-CM

## 2021-04-20 PROCEDURE — 97112 NEUROMUSCULAR REEDUCATION: CPT

## 2021-04-20 PROCEDURE — 97110 THERAPEUTIC EXERCISES: CPT

## 2021-04-20 PROCEDURE — 97140 MANUAL THERAPY 1/> REGIONS: CPT

## 2021-04-20 NOTE — PROGRESS NOTES
Daily Note     Today's date: 2021  Patient name: Mahin Boss  : 2000  MRN: [id number]  Referring provider: Pebbles Graham MD  Dx:   Encounter Diagnosis     ICD-10-CM    1  Sprain of anterior talofibular ligament of right ankle, initial encounter  S93 491A    2  Sprain of deltoid ligament of right ankle, initial encounter  S93 421A        Start Time: 1430  Stop Time: 1520  Total time in clinic (min): 50 minutes  Subjective: Pt arrives to first f/u since IE reporting he has been able to ween from CAM boot but does wrap his (R) ankle /c ACE bandage  Objective: See treatment diary below    Assessment: Implemented exercise diary per PT POC as indicated below  Instructed pt that interventions may at time be uncomfortable but should not be painful and to perform all activities within PFR and notify therapist of any increases in pain Sx  Soft end feel during (R) ankle PROM 2/2 pt reports of significant pain  Provided extensive pt education re: PT POC, progressing program as tolerated, proprioception, and ACE wrap (appropriate sizing, wrapping patterns + indications for, and maintenance + cleaning)  Plan: Cont /c PT POC  Progress as tolerated  Precautions: (R) ankle sprain 3/31/2021; Start transitioning from CAM boot to sneaker 2021 per last MD appt on 21    Date            FOTO IE             Re-eval IE                Manuals            (R) ankle PROM nv SP p!           (R) ankle TC joint mobs nv            Ace Wrap   SP                        Neuro Re-Ed            Biodex weight shifts  nv 4' ea static           Towel scrunches nv            Intrinsic doming nv            BAPs (R) nv            SLS on (R)*             Tandem stance nv                                      Ther Ex            Runner's gastroc (S)             Windshield wipers nv            Seated HR/TR nv            Ankle pumps, elevated nv            Toe flex/ext, elevated nv Ankle alphabets, elevated nv            Iso ankle 4 way  nv 5"x5           Supine gastroc (S) with strap (R) 4x15" HEP            Supine soleus (S) with strap (R) 4x15" HEP            Supine GT (S) with strap, (R) 5x10" HEP            Clams             4 way ankle Tband*             4 way SLR nv            Pt Edu AL HEP SP                        Ther Activity 4/14 04/20           Recumbent bike nv 8' L1           Squats             Sidesteps                          Gait Training 4/14 04/20                                     Modalities 4/14 04/20           fernando Willis, PTA

## 2021-04-22 ENCOUNTER — OFFICE VISIT (OUTPATIENT)
Dept: PHYSICAL THERAPY | Facility: CLINIC | Age: 21
End: 2021-04-22
Payer: COMMERCIAL

## 2021-04-22 DIAGNOSIS — S93.421A SPRAIN OF DELTOID LIGAMENT OF RIGHT ANKLE, INITIAL ENCOUNTER: ICD-10-CM

## 2021-04-22 DIAGNOSIS — S93.491A SPRAIN OF ANTERIOR TALOFIBULAR LIGAMENT OF RIGHT ANKLE, INITIAL ENCOUNTER: Primary | ICD-10-CM

## 2021-04-22 PROCEDURE — 97530 THERAPEUTIC ACTIVITIES: CPT

## 2021-04-22 PROCEDURE — 97110 THERAPEUTIC EXERCISES: CPT

## 2021-04-22 PROCEDURE — 97112 NEUROMUSCULAR REEDUCATION: CPT

## 2021-04-22 NOTE — PROGRESS NOTES
Daily Note     Today's date: 2021  Patient name: Lakisha Zuluaga  : 2000  MRN: [id number]  Referring provider: Mercedes Hu MD  Dx:   Encounter Diagnosis     ICD-10-CM    1  Sprain of anterior talofibular ligament of right ankle, initial encounter  S93 491A    2  Sprain of deltoid ligament of right ankle, initial encounter  P45 291B                   Subjective: Pt states that he has not been wearing the CAM boot  Pt states that today, his ankle is "throbbing" however is not painful  Objective: See treatment diary below      Assessment: Tolerated treatment well  Pt able to complete exercises as listed without c/o pain however post tx session he reported inc soreness thru the ankle  He stated "it is not nearly as bad though as it used to be " Educated pt today about DOMs, goals of exercises, and modulating swelling at home (via exercise, elevation, compression)  Patient demonstrated fatigue post treatment, exhibited good technique with therapeutic exercises and would benefit from continued PT to progress WB tolerance and DF ROM to improve pt's tolerance to ambulating with reduced antalgia daily  Plan: Continue per plan of care  Progress treatment as tolerated  Precautions: (R) ankle sprain 3/31/2021; Start transitioning from CAM boot to sneaker 2021 per last MD appt on 21    Date           FOTO IE             Re-eval IE                Manuals           (R) ankle PROM nv SP p!           (R) ankle TC joint mobs nv            Ace Wrap   SP                        Neuro Re-Ed           Biodex weight shifts  nv 4' ea static 4' ea static          Towel scrunches nv            Intrinsic doming nv            BAPs (R) nv            SLS on (R)*             Tandem stance nv  2x20" ea                                    Ther Ex           Runner's gastroc (S)   4x15"          Windshield wipers nv            Seated HR/TR nv  20x ea          Ankle pumps, elevated nv  30x HEP          Toe flex/ext, elevated nv  30x HEP          Ankle alphabets, elevated nv  1x HEP          Iso ankle 4 way  nv 5"x5 10x5" ea          Supine gastroc (S) with strap (R) 4x15" HEP            Supine soleus (S) with strap (R) 4x15" HEP            Supine GT (S) with strap, (R) 5x10" HEP            Clams             4 way ankle Tband*             4 way SLR nv            Pt Edu AL HEP SP AL HEP, DOMs                       Ther Activity 4/14 04/20 4/22          Recumbent bike nv 8' L1 8' L1          Squats             Sidesteps                          Gait Training 4/14 04/20 4/22                                    Modalities 4/14 04/20 4/22          Ice, prn   deferred

## 2021-04-30 ENCOUNTER — OFFICE VISIT (OUTPATIENT)
Dept: PHYSICAL THERAPY | Facility: CLINIC | Age: 21
End: 2021-04-30
Payer: COMMERCIAL

## 2021-04-30 DIAGNOSIS — S93.421A SPRAIN OF DELTOID LIGAMENT OF RIGHT ANKLE, INITIAL ENCOUNTER: ICD-10-CM

## 2021-04-30 DIAGNOSIS — S93.491A SPRAIN OF ANTERIOR TALOFIBULAR LIGAMENT OF RIGHT ANKLE, INITIAL ENCOUNTER: Primary | ICD-10-CM

## 2021-04-30 PROCEDURE — 97110 THERAPEUTIC EXERCISES: CPT

## 2021-04-30 PROCEDURE — 97112 NEUROMUSCULAR REEDUCATION: CPT

## 2021-04-30 PROCEDURE — 97530 THERAPEUTIC ACTIVITIES: CPT

## 2021-04-30 NOTE — PROGRESS NOTES
Daily Note     Today's date: 2021  Patient name: Rachell Bay  : 2000  MRN: [id number]  Referring provider: Anca Castro MD  Dx:   Encounter Diagnosis     ICD-10-CM    1  Sprain of anterior talofibular ligament of right ankle, initial encounter  S93 491A    2  Sprain of deltoid ligament of right ankle, initial encounter  S93 421A        Start Time: 0900  Stop Time: 0950  Total time in clinic (min): 50 minutes    Subjective: Pt arrives to today's Tx reporting his (R) ankle is doing "much better" and can tell he is walking better (no AD, CAM BOOT, and lessened antalgic gait pattern observed)  Pt reports he still has some discomfort when taking a "hard step" - clarifies that if he is trying to walk faster than a relaxed pace he has discomfort  Pt reports HEP compliance  Pt notes he would like to get back to playing basketball  Objective: See treatment diary below    Assessment: Tolerated treatment well  Pt able to complete exercises as listed without c/o pain however post tx session he reported inc soreness thru the ankle  He stated "it is not nearly as bad though as it used to be " Educated pt today about DOMs, goals of exercises, and modulating swelling at home (via exercise, elevation, compression)  Pt demonstrates good squat form - consider adding KB nv  Pt admits to fear of re-injury and reports hesitation to fully commit to activities; however, after providing pt education re: injury and safely and appropriately challenging pt demonstrated good form and tolerance to activities  Pt demonstrated improved form on SLS on (R)LE as reps progressed  Patient demonstrated fatigue post treatment, exhibited good technique with therapeutic exercises and would benefit from continued PT to progress WB tolerance and DF ROM to improve pt's tolerance to ambulating with reduced antalgia daily      Plan: Pt will be out of state 1-2 weeks - provide updated HEP NV and remind pt of free COVID testing available and required before return to [facility]  Precautions: (R) ankle sprain 3/31/2021; Start transitioning from CAM boot to sneaker 4/16/2021 per last MD appt on 4/2/21    Date 4/14 04/20 4/22 04/30         FOTO IE             Re-eval IE                Manuals 4/14 04/20 4/22 04/30         (R) ankle PROM nv SP p!           (R) ankle TC joint mobs nv            Ace Wrap   SP                        Neuro Re-Ed 4/14 04/20 4/22 04/30         Biodex weight shifts  nv 4' ea static 4' ea static          Towel scrunches nv            Intrinsic doming nv            BAPs (R) nv   20ea m/l, a/p, cw, ccw         SLS on (R)*    10"x5         Tandem stance nv  2x20" ea 20"x3ea Airex                                   Ther Ex 4/14 04/20 4/22 04/30         Runner's gastroc (S)   4x15" 30"x4 wedge         Windshield wipers nv   20ea         Seated HR/TR nv  20x ea          Ankle pumps, elevated nv  30x HEP          Toe flex/ext, elevated nv  30x HEP          Ankle alphabets, elevated nv  1x HEP          Iso ankle 4 way  nv 5"x5 10x5" ea          Supine gastroc (S) with strap (R) 4x15" HEP            Supine soleus (S) with strap (R) 4x15" HEP            Supine GT (S) with strap, (R) 5x10" HEP            Clams             4 way ankle Tband*             4 way SLR nv            Pt Edu AL HEP SP AL HEP, DOMs SP                      Ther Activity 4/14 04/20 4/22 04/30         Recumbent bike nv 8' L1 8' L1 8' L1         Squats    30x         Sidesteps    GTB x walks 3 laps                      Gait Training 4/14 04/20 4/22 04/30                                   Modalities 4/14 04/20 4/22 04/30         Ice, prn   deferred                           Steven Ascencio, PTA

## 2021-06-01 NOTE — TELEPHONE ENCOUNTER
Patients wife called in needing to reschedule his appointment he missed and when I asked her name  I noticed she was not on his chart for contacts, her name was Kevjacek Ohara, I believe I heard that correctly  I asked if he was home just so I know he is around to say hello and make sure its ok      She then said "I dont know why this is an issue I am his wife , ill just call back" and hungup

## 2021-06-15 ENCOUNTER — OFFICE VISIT (OUTPATIENT)
Dept: OBGYN CLINIC | Facility: CLINIC | Age: 21
End: 2021-06-15
Payer: COMMERCIAL

## 2021-06-15 VITALS — BODY MASS INDEX: 28.23 KG/M2 | WEIGHT: 220 LBS | HEIGHT: 74 IN

## 2021-06-15 DIAGNOSIS — S93.491A SPRAIN OF ANTERIOR TALOFIBULAR LIGAMENT OF RIGHT ANKLE, INITIAL ENCOUNTER: Primary | ICD-10-CM

## 2021-06-15 PROCEDURE — 99213 OFFICE O/P EST LOW 20 MIN: CPT | Performed by: ORTHOPAEDIC SURGERY

## 2021-06-15 NOTE — LETTER
Giana 15, 2021     Patient: Dimitri Joanie   YOB: 2000   Date of Visit: 6/15/2021       To Whom it May Concern:    Ca Matamoros is under my professional care  He was seen in my office on 6/15/2021  He may return to work without restrictions on 06/16/2021  If you have any questions or concerns, please don't hesitate to call           Sincerely,          Vamsi Mota MD        CC: Dimitri Nair

## 2021-06-15 NOTE — PROGRESS NOTES
NATHAN Hutchison  Attending, Orthopaedic Surgery  [facility] [address] [facility] [address] [facility]      ORTHOPAEDIC FOOT AND ANKLE CLINIC VISIT     Assessment:     Encounter Diagnosis   Name Primary?  Sprain of anterior talofibular ligament of right ankle, initial encounter Yes            Plan:   · The patient verbalized understanding of exam findings and treatment plan  We engaged in the shared decision-making process and treatment options were discussed at length with the patient  Surgical and conservative management discussed today along with risks and benefits  · [facility] has attended 5 sessions of formal physical therapy and has significant improvement in symptoms  · He has not pain of the ATFL  · Continue ice, elevation, compression stocking as needed for pain and swelling control  · Return to work note provided  Return if symptoms worsen or fail to improve  History of Present Illness:   Chief Complaint:   Chief Complaint   Patient presents with    Right Ankle - Follow-up     Bakari Burton is a 24 y o  male who is being seen in follow-up for Right ATFL sprain  When we last saw he we recommended formal physical therapy to work strengthening and range of motion  Pain has  improved  No Residual pain  Pain/symptom timing:  Worse during the day when active  Pain/symptom context:  Worse with activites and work  Pain/symptom modifying factors:  Rest makes better, activities make worse  Pain/symptom associated signs/symptoms: none    Prior treatment   · NSAIDsYes   · Injections No   · Bracing/Orthotics No    · Physical Therapy Yes     Orthopedic Surgical History:   See Below    Past Medical, Surgical and Social History:  Past Medical History:  has a past medical history of No known problems  Problem List: does not have a problem list on file  Past Surgical History:  has no past surgical history on file  Family History: family history is not on file    Social History:  reports that he has never smoked  He has never used smokeless tobacco  He reports previous alcohol use  He reports previous drug use  Current Medications: has a current medication list which includes the following prescription(s): ibuprofen  Allergies: is allergic to amoxicillin  Review of Systems:  General- denies fever/chills  HEENT- denies hearing loss or sore throat  Eyes- denies eye pain or visual disturbances, denies red eyes  Respiratory- denies cough or SOB  Cardio- denies chest pain or palpitations  GI- denies abdominal pain  Endocrine- denies urinary frequency  Urinary- denies pain with urination  Musculoskeletal- Negative except noted above  Skin- denies rashes or wounds  Neurological- denies dizziness or headache  Psychiatric- denies anxiety or difficulty concentrating    Physical Exam:   Ht 6' 2" (1 88 m)   Wt 99 8 kg (220 lb)   BMI 28 25 kg/m²   General/Constitutional: No apparent distress: well-nourished and well developed  Eyes: normal ocular motion  Lymphatic: No appreciable lymphadenopathy  Respiratory: Non-labored breathing  Vascular: No edema, swelling or tenderness, except as noted in detailed exam   Integumentary: No impressive skin lesions present, except as noted in detailed exam   Neuro: No ataxia or tremors noted  Psych: Normal mood and affect, oriented to person, place and time  Appropriate affect  Musculoskeletal: Normal, except as noted in detailed exam and in HPI      Examination    Right    Gait Normal   Musculoskeletal No TTP    Skin Normal      Nails Normal    Range of Motion  10 degrees dorsiflexion, 30 degrees plantarflexion  Subtalar motion: normal    Stability Stable    Muscle Strength 5/5 tibialis anterior  5/5 gastrocnemius-soleus  5/5 posterior tibialis  5/5 peroneal/eversion strength  5/5 EHL  5/5 FHL    Neurologic Normal    Sensation  Intact to light touch throughout sural, saphenous, superficial peroneal, deep peroneal and medial/lateral plantar nerve distributions  Kelly-Torsten 5 07 filament (10g) testing  deferred  Cardiovascular Brisk capillary refill < 2 seconds,intact DP and PT pulses    Special Tests None      Imaging Studies:   No new imaging        James R Lachman, MD  Foot & Ankle Surgery   Department of [address] [place] [address]      I personally performed the service  Charlynne Ruts Lachman, MD    Scribe Attestation    I,:  Olimpia Cespedes MA am acting as a scribe while in the presence of the attending physician :       I,:  Deirdre Wise MD personally performed the services described in this documentation    as scribed in my presence :